# Patient Record
Sex: FEMALE | Race: WHITE
[De-identification: names, ages, dates, MRNs, and addresses within clinical notes are randomized per-mention and may not be internally consistent; named-entity substitution may affect disease eponyms.]

---

## 2017-12-27 ENCOUNTER — HOSPITAL ENCOUNTER (OUTPATIENT)
Dept: HOSPITAL 61 - RAD | Age: 42
Discharge: HOME | End: 2017-12-27
Attending: INTERNAL MEDICINE
Payer: COMMERCIAL

## 2017-12-27 DIAGNOSIS — R05: Primary | ICD-10-CM

## 2017-12-27 PROCEDURE — 71020: CPT

## 2019-01-31 ENCOUNTER — HOSPITAL ENCOUNTER (OUTPATIENT)
Dept: HOSPITAL 63 - US | Age: 44
Discharge: HOME | End: 2019-01-31
Attending: NURSE PRACTITIONER
Payer: COMMERCIAL

## 2019-01-31 DIAGNOSIS — N92.1: ICD-10-CM

## 2019-01-31 DIAGNOSIS — Z12.31: Primary | ICD-10-CM

## 2019-01-31 PROCEDURE — 77067 SCR MAMMO BI INCL CAD: CPT

## 2019-01-31 PROCEDURE — 77063 BREAST TOMOSYNTHESIS BI: CPT

## 2019-01-31 PROCEDURE — 76856 US EXAM PELVIC COMPLETE: CPT

## 2019-01-31 PROCEDURE — 76830 TRANSVAGINAL US NON-OB: CPT

## 2019-02-18 ENCOUNTER — HOSPITAL ENCOUNTER (OUTPATIENT)
Dept: HOSPITAL 63 - MAMMO | Age: 44
Discharge: HOME | End: 2019-02-18
Attending: NURSE PRACTITIONER
Payer: COMMERCIAL

## 2019-02-18 DIAGNOSIS — R92.8: Primary | ICD-10-CM

## 2019-02-18 PROCEDURE — 77065 DX MAMMO INCL CAD UNI: CPT

## 2019-02-18 PROCEDURE — 76641 ULTRASOUND BREAST COMPLETE: CPT

## 2019-02-18 NOTE — RAD
DATE: 2/18/2019   



EXAM: DIGITAL DIAGNOSTIC RT, BREAST RIGHT



HISTORY: Right breast microcalcifications   



COMPARISON: 1/31/2019, 9/4/2014



This study was interpreted with the benefit of Computerized Aided Detection

(CAD).



Breast Density: SCATTERED The breast parenchyma shows scattered fibroglandular

densities. Breast parenchyma level B.



FINDINGS: Additional views of the right breast were obtained and correlated

with the screening images.  At the 12:00 retroareolar level there are several

small microcalcifications.  These tend to be smooth and rounded.  Some of

these were present on the previous study.  They have a relatively benign

appearance.



There is a tiny 4 mm nodule in the posterolateral aspect of the right breast

at approximately the 9:00 location, best seen on the screening tomosynthesis

imaging.  There is a smooth microcalcification along its margin.  There is a

questionable additional tiny microcalcification along its margin as seen on

the straight mediolateral view.  The larger microcalcifications was present in

retrospect on the 2014 exam.  This is most likely a tiny fibroadenoma.





Right breast ultrasound, 2/18/2019:



A targeted ultrasound exam of the lateral aspect of the right breast was

performed centered at the 9:00 location.  No solid mass or unusual fluid

collection is seen.  No sonographic correlate for the mammographic finding

could be delineated.





IMPRESSION: 

1.  Probably benign right retroareolar microcalcifications.

2.  Probably benign small right lateral breast nodule.

3.  Follow-up right mammography in 6 months and bilateral mammography at one

year is suggested.







BI-RADS CATEGORY: 3 PROBABLY BENIGN FINDING(S)-SHORT INTERVAL FOLLOW-UP

SUGGESTED



RECOMMENDED FOLLOW-UP: 6M 6 MONTH FOLLOW-UP



PQRS compliance statement: Patient information was entered into a reminder

system with a target due date     for the next mammogram.



Mammography is a sensitive method for finding small breast cancers, but it

does not detect them all and is not a substitute for careful clinical

examination.  A negative mammogram does not negate a clinically suspicious

finding and should not result in delay in biopsying a clinically suspicious

abnormality.



"Our facility is accredited by the [employer] Mammography

Program."

## 2021-06-22 ENCOUNTER — HOSPITAL ENCOUNTER (OUTPATIENT)
Dept: HOSPITAL 63 - MAMMO | Age: 46
End: 2021-06-22
Attending: FAMILY MEDICINE
Payer: COMMERCIAL

## 2021-06-22 DIAGNOSIS — R92.1: Primary | ICD-10-CM

## 2021-06-22 PROCEDURE — 77066 DX MAMMO INCL CAD BI: CPT

## 2021-06-22 NOTE — RAD
EXAM: BILATERAL DIGITAL DIAGNOSTIC MAMMOGRAPHY.



HISTORY: Six-month follow-up was recommended in February 2019. Yearly examination.



TECHNIQUE: Bilateral full field digital images were obtained in CC and MLO projections. Computer-aide
d detection was applied.



COMPARISON: 01/31/2019, 02/18/2019, 09/04/2014.



COMPOSITION: B. There are scattered areas of fibroglandular density.



FINDINGS: The small nodule of prior concern superolaterally on the right is stable and contains benig
n-appearing calcifications. A few rounded calcifications within the right subareolar region are stabl
e and also appear benign. There are no suspicious masses, microcalcifications or architectural distor
tion. The parenchymal pattern is stable.



BI-RADS CATEGORY 2: Benign.



RECOMMENDATION:

1. Routine screening mammography in one year.



If mammography demonstrates dense breast tissue (heterogenously dense or extremely dense, category C 
or D), which could hide abnormalities, and if other risk factors for breast cancer have been identifi
ed, supplemental screening tests that may be suggested by the ordering physician may be of benefit. D
ense breast tissue, in and of itself, is a relatively common condition. Therefore, this information i
s not provided to cause undue concern, but rather to raise awareness and to promote discussion with t
he referring physician regarding the presence of other risk factors, in addition to dense breast tiss
ue. The results of this mammography examination is provided to the patient and referring physician. T
he patient should contact their referring physician if any questions or concerns exist regarding this
 report.



PQRS compliance statement -  Patient information was entered into a reminder system with a target due
 date for the next mammogram. 



"Our facility is accredited by the [employer] Mammography Program."



Electronically signed by: STEPHANIE Arceo MD (6/22/2021 3:42 PM) [facility]